# Patient Record
Sex: MALE | Race: OTHER | Employment: OTHER | URBAN - METROPOLITAN AREA
[De-identification: names, ages, dates, MRNs, and addresses within clinical notes are randomized per-mention and may not be internally consistent; named-entity substitution may affect disease eponyms.]

---

## 2018-06-15 NOTE — PATIENT DISCUSSION
"""MRx given "" ""Recommend Phaco with Intra Ocular Lens. Order scans for IOL measurements.  OS then OD

## 2018-09-25 NOTE — PATIENT DISCUSSION
"""S/P IOL OS: Jai [id number] 23 @ 170Âº +Femto/Arcs +TM. Continue post operative instructions and drops per schedule.  """

## 2018-10-04 NOTE — PATIENT DISCUSSION
"""S/P IOL OS: Jai [id number] 23 @ 170Âº +Femto/Arcs +TM. Continue post operative instructions and drops per schedule.  """

## 2018-10-09 NOTE — PATIENT DISCUSSION
"""S/P IOL OD: Tecnis [id number] +23.5 @ 180Âº (Target: -0.25) +Femto/Arcs +Omidria. Continue post operative instructions and drops per schedule.  """

## 2018-11-29 NOTE — PATIENT DISCUSSION
"""S/P IOL OU: OD: Tecnis [id number] +23.5 @ 180Âº (Target: -0.25)Femto/Arcs. OS: Tecnis [id number] 23 @ 170Âº/Arcs +TM. "

## 2018-11-29 NOTE — PATIENT DISCUSSION
"""Recommend strict compliance with artificial tears.  Discussed starting Restasis; patient hesitant ""

## 2023-02-27 NOTE — PATIENT DISCUSSION
"""Phaco with IOL OD: 10/09/2018 Jai [id number] +23.5 @ 180Âº [de-identified] : R SF\par Swelling\par Mallet def\par Min tender\par \par Xrays distal phalanx avulsion fracture

## 2023-04-18 ENCOUNTER — COMPREHENSIVE EXAM (OUTPATIENT)
Dept: URBAN - METROPOLITAN AREA CLINIC 37 | Facility: CLINIC | Age: 63
End: 2023-04-18

## 2023-04-18 DIAGNOSIS — H52.03: ICD-10-CM

## 2023-04-18 DIAGNOSIS — H25.13: ICD-10-CM

## 2023-04-18 PROCEDURE — 92014 COMPRE OPH EXAM EST PT 1/>: CPT

## 2023-04-18 PROCEDURE — 92015 DETERMINE REFRACTIVE STATE: CPT

## 2023-04-18 ASSESSMENT — VISUAL ACUITY
OS_CC: 20/25
OD_CC: 20/25

## 2023-04-18 ASSESSMENT — TONOMETRY
OS_IOP_MMHG: 12
OD_IOP_MMHG: 12